# Patient Record
(demographics unavailable — no encounter records)

---

## 2025-01-17 NOTE — HISTORY OF PRESENT ILLNESS
[FreeTextEntry1] : Pablo Rose is a 57 y/o male who presents for evaluation of possible rectal abscess/fistula. He first met with Dr. Elias Chavis about a month ago with rectal abscess which was drained. He has since had sutures and drain removed and was told he would need a fistulotomy. His last colonoscopy was two months ago with Dr. Koenig which noted benign polyps.  Patient denies weight loss, abdominal pain, nausea/vomiting, diarrhea, constipation and bleeding.

## 2025-01-17 NOTE — ASSESSMENT
[FreeTextEntry1] : 58-year-old gentleman who presents for a consultation regarding possible anal fistula.  I performed a fissurectomy and sphincterotomy on this gentleman years ago.  He had a very good result afterwards with resolution of his pain.  He had a colonoscopy done approximately 1 to 2 months ago which was unremarkable.  Approximately a month ago he developed significant perianal pain and swelling.  He was diagnosed with a perianal abscess which was incised and drained.  He was placed on antibiotics.  The drain has subsequently been removed and he has no further pain or drainage.  Inspection of the anorectal area reveals his incision and drainage site just to the right of the posterior midline.  This area was probed but a probe did not fall into a tract overtly or communicate with the anus.  He certainly may develop an anal fistula.  Since he is asymptomatic at this point I believe he can be observed.  If he develops recurrent drainage or recurrent abscess the issue will need to be addressed.

## 2025-01-17 NOTE — PHYSICAL EXAM
[Normal Breath Sounds] : Normal breath sounds [Normal Heart Sounds] : normal heart sounds [No Rash or Lesion] : No rash or lesion [Alert] : alert [Oriented to Person] : oriented to person [Oriented to Place] : oriented to place [Oriented to Time] : oriented to time [Calm] : calm [de-identified] : Abdomen soft and NT  [de-identified] : NAD  [de-identified] : NCAT [de-identified] : + ROM

## 2025-01-17 NOTE — HISTORY OF PRESENT ILLNESS
[FreeTextEntry1] : Pablo Rose is a 59 y/o male who presents for evaluation of possible rectal abscess/fistula. He first met with Dr. Elias Chavis about a month ago with rectal abscess which was drained. He has since had sutures and drain removed and was told he would need a fistulotomy. His last colonoscopy was two months ago with Dr. Koenig which noted benign polyps.  Patient denies weight loss, abdominal pain, nausea/vomiting, diarrhea, constipation and bleeding.

## 2025-01-17 NOTE — PHYSICAL EXAM
[Normal Breath Sounds] : Normal breath sounds [Normal Heart Sounds] : normal heart sounds [No Rash or Lesion] : No rash or lesion [Alert] : alert [Oriented to Person] : oriented to person [Oriented to Place] : oriented to place [Oriented to Time] : oriented to time [Calm] : calm [de-identified] : Abdomen soft and NT  [de-identified] : NAD  [de-identified] : NCAT [de-identified] : + ROM